# Patient Record
Sex: MALE | Race: BLACK OR AFRICAN AMERICAN
[De-identification: names, ages, dates, MRNs, and addresses within clinical notes are randomized per-mention and may not be internally consistent; named-entity substitution may affect disease eponyms.]

---
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
## 2025-03-01 ENCOUNTER — OUTPATIENT (OUTPATIENT)
Dept: OUTPATIENT SERVICES | Age: 20
LOS: 1 days | Discharge: HOME | End: 2025-03-01

## 2025-03-05 ENCOUNTER — LABORATORY RESULT (OUTPATIENT)
Age: 20
End: 2025-03-05

## 2025-03-05 ENCOUNTER — RESULT REVIEW (OUTPATIENT)
Age: 20
End: 2025-03-05

## 2025-03-05 ENCOUNTER — APPOINTMENT (OUTPATIENT)
Dept: PEDIATRIC HEMATOLOGY/ONCOLOGY | Facility: CLINIC | Age: 20
End: 2025-03-05
Payer: COMMERCIAL

## 2025-03-05 VITALS
RESPIRATION RATE: 22 BRPM | DIASTOLIC BLOOD PRESSURE: 75 MMHG | BODY MASS INDEX: 21.13 KG/M2 | TEMPERATURE: 98.06 F | OXYGEN SATURATION: 97 % | SYSTOLIC BLOOD PRESSURE: 121 MMHG | WEIGHT: 145.95 LBS | HEART RATE: 75 BPM | HEIGHT: 69.69 IN

## 2025-03-05 DIAGNOSIS — Z92.89 PERSONAL HISTORY OF OTHER MEDICAL TREATMENT: ICD-10-CM

## 2025-03-05 DIAGNOSIS — Z79.64 LONG TERM (CURRENT) USE OF MYELOSUPPRESSIVE AGENT: ICD-10-CM

## 2025-03-05 DIAGNOSIS — Z71.87 ENCOUNTER FOR PEDIATRIC-TO-ADULT TRANSITION COUNSELING: ICD-10-CM

## 2025-03-05 DIAGNOSIS — D57.42 SICKLE-CELL THALASSEMIA BETA ZERO WITHOUT CRISIS: ICD-10-CM

## 2025-03-05 LAB
BASOPHILS # BLD AUTO: 0.04 K/UL — SIGNIFICANT CHANGE UP (ref 0–0.2)
BASOPHILS NFR BLD AUTO: 0.6 % — SIGNIFICANT CHANGE UP (ref 0–2)
EOSINOPHIL # BLD AUTO: 0.06 K/UL — SIGNIFICANT CHANGE UP (ref 0–0.5)
EOSINOPHIL NFR BLD AUTO: 0.9 % — SIGNIFICANT CHANGE UP (ref 0–6)
HCT VFR BLD CALC: 35.1 % — LOW (ref 39–50)
HGB BLD-MCNC: 12.2 G/DL — LOW (ref 13–17)
IANC: 3.69 K/UL — SIGNIFICANT CHANGE UP (ref 1.8–7.4)
IMM GRANULOCYTES NFR BLD AUTO: 0.3 % — SIGNIFICANT CHANGE UP (ref 0–0.9)
LYMPHOCYTES # BLD AUTO: 2.24 K/UL — SIGNIFICANT CHANGE UP (ref 1–3.3)
LYMPHOCYTES # BLD AUTO: 33.6 % — SIGNIFICANT CHANGE UP (ref 13–44)
MCHC RBC-ENTMCNC: 27.2 PG — SIGNIFICANT CHANGE UP (ref 27–34)
MCHC RBC-ENTMCNC: 34.8 G/DL — SIGNIFICANT CHANGE UP (ref 32–36)
MCV RBC AUTO: 78.3 FL — LOW (ref 80–100)
MONOCYTES # BLD AUTO: 0.61 K/UL — SIGNIFICANT CHANGE UP (ref 0–0.9)
MONOCYTES NFR BLD AUTO: 9.2 % — SIGNIFICANT CHANGE UP (ref 2–14)
NEUTROPHILS # BLD AUTO: 3.69 K/UL — SIGNIFICANT CHANGE UP (ref 1.8–7.4)
NEUTROPHILS NFR BLD AUTO: 55.4 % — SIGNIFICANT CHANGE UP (ref 43–77)
NRBC # BLD AUTO: 0.06 K/UL — HIGH (ref 0–0)
NRBC # FLD: 0.06 K/UL — HIGH (ref 0–0)
NRBC BLD AUTO-RTO: 0 /100 WBCS — SIGNIFICANT CHANGE UP (ref 0–0)
PLATELET # BLD AUTO: 202 K/UL — SIGNIFICANT CHANGE UP (ref 150–400)
PMV BLD: 9.9 FL — SIGNIFICANT CHANGE UP (ref 7–13)
RBC # BLD: 4.48 M/UL — SIGNIFICANT CHANGE UP (ref 4.2–5.8)
RBC # BLD: 4.48 M/UL — SIGNIFICANT CHANGE UP (ref 4.2–5.8)
RBC # FLD: 14.1 % — SIGNIFICANT CHANGE UP (ref 10.3–14.5)
RETICS #: 169.3 K/UL — HIGH (ref 25–125)
RETICS/RBC NFR: 3.8 % — HIGH (ref 0.5–2.5)
WBC # BLD: 6.66 K/UL — SIGNIFICANT CHANGE UP (ref 3.8–10.5)
WBC # FLD AUTO: 6.66 K/UL — SIGNIFICANT CHANGE UP (ref 3.8–10.5)

## 2025-03-05 PROCEDURE — 99214 OFFICE O/P EST MOD 30 MIN: CPT

## 2025-03-06 DIAGNOSIS — Z23 ENCOUNTER FOR IMMUNIZATION: ICD-10-CM

## 2025-03-06 DIAGNOSIS — D57.01 HB-SS DISEASE WITH ACUTE CHEST SYNDROME: ICD-10-CM

## 2025-03-06 DIAGNOSIS — J45.909 UNSPECIFIED ASTHMA, UNCOMPLICATED: ICD-10-CM

## 2025-03-06 DIAGNOSIS — D57.42 SICKLE-CELL THALASSEMIA BETA ZERO WITHOUT CRISIS: ICD-10-CM

## 2025-03-06 DIAGNOSIS — D57.40 SICKLE-CELL THALASSEMIA WITHOUT CRISIS: ICD-10-CM

## 2025-05-01 ENCOUNTER — OUTPATIENT (OUTPATIENT)
Dept: OUTPATIENT SERVICES | Age: 20
LOS: 1 days | Discharge: HOME | End: 2025-05-01

## 2025-05-14 ENCOUNTER — RESULT REVIEW (OUTPATIENT)
Age: 20
End: 2025-05-14

## 2025-05-14 ENCOUNTER — APPOINTMENT (OUTPATIENT)
Dept: PEDIATRIC HEMATOLOGY/ONCOLOGY | Facility: CLINIC | Age: 20
End: 2025-05-14
Payer: COMMERCIAL

## 2025-05-14 VITALS
HEIGHT: 69.69 IN | RESPIRATION RATE: 20 BRPM | SYSTOLIC BLOOD PRESSURE: 119 MMHG | HEART RATE: 65 BPM | WEIGHT: 146.83 LBS | BODY MASS INDEX: 21.26 KG/M2 | DIASTOLIC BLOOD PRESSURE: 65 MMHG | OXYGEN SATURATION: 100 % | TEMPERATURE: 98.42 F

## 2025-05-14 DIAGNOSIS — Z92.89 PERSONAL HISTORY OF OTHER MEDICAL TREATMENT: ICD-10-CM

## 2025-05-14 DIAGNOSIS — D57.42 SICKLE-CELL THALASSEMIA BETA ZERO WITHOUT CRISIS: ICD-10-CM

## 2025-05-14 DIAGNOSIS — D57.40 SICKLE-CELL THALASSEMIA W/OUT CRISIS: ICD-10-CM

## 2025-05-14 DIAGNOSIS — Z71.87 ENCOUNTER FOR PEDIATRIC-TO-ADULT TRANSITION COUNSELING: ICD-10-CM

## 2025-05-14 DIAGNOSIS — Z79.64 LONG TERM (CURRENT) USE OF MYELOSUPPRESSIVE AGENT: ICD-10-CM

## 2025-05-14 LAB
BASOPHILS # BLD AUTO: 0.08 K/UL — SIGNIFICANT CHANGE UP (ref 0–0.2)
BASOPHILS NFR BLD AUTO: 0.9 % — SIGNIFICANT CHANGE UP (ref 0–2)
EOSINOPHIL # BLD AUTO: 0.25 K/UL — SIGNIFICANT CHANGE UP (ref 0–0.5)
EOSINOPHIL NFR BLD AUTO: 2.7 % — SIGNIFICANT CHANGE UP (ref 0–6)
HCT VFR BLD CALC: 34 % — LOW (ref 39–50)
HGB BLD-MCNC: 11.7 G/DL — LOW (ref 13–17)
IMM GRANULOCYTES NFR BLD AUTO: 0.5 % — SIGNIFICANT CHANGE UP (ref 0–0.9)
LYMPHOCYTES # BLD AUTO: 3.26 K/UL — SIGNIFICANT CHANGE UP (ref 1–3.3)
LYMPHOCYTES # BLD AUTO: 35.6 % — SIGNIFICANT CHANGE UP (ref 13–44)
MCHC RBC-ENTMCNC: 25.9 PG — LOW (ref 27–34)
MCHC RBC-ENTMCNC: 34.4 G/DL — SIGNIFICANT CHANGE UP (ref 32–36)
MCV RBC AUTO: 75.2 FL — LOW (ref 80–100)
MONOCYTES # BLD AUTO: 0.65 K/UL — SIGNIFICANT CHANGE UP (ref 0–0.9)
MONOCYTES NFR BLD AUTO: 7.1 % — SIGNIFICANT CHANGE UP (ref 2–14)
NEUTROPHILS # BLD AUTO: 4.88 K/UL — SIGNIFICANT CHANGE UP (ref 1.8–7.4)
NEUTROPHILS NFR BLD AUTO: 53.2 % — SIGNIFICANT CHANGE UP (ref 43–77)
NRBC # BLD AUTO: 0.16 K/UL — HIGH (ref 0–0)
NRBC # FLD: 0.16 K/UL — HIGH (ref 0–0)
NRBC BLD AUTO-RTO: 2 /100 WBCS — HIGH (ref 0–0)
PLATELET # BLD AUTO: 187 K/UL — SIGNIFICANT CHANGE UP (ref 150–400)
PMV BLD: 9 FL — SIGNIFICANT CHANGE UP (ref 7–13)
RBC # BLD: 4.52 M/UL — SIGNIFICANT CHANGE UP (ref 4.2–5.8)
RBC # BLD: 4.52 M/UL — SIGNIFICANT CHANGE UP (ref 4.2–5.8)
RBC # FLD: 14.7 % — HIGH (ref 10.3–14.5)
RETICS #: 226.9 K/UL — HIGH (ref 25–125)
RETICS/RBC NFR: 5 % — HIGH (ref 0.5–2.5)
WBC # BLD: 9.17 K/UL — SIGNIFICANT CHANGE UP (ref 3.8–10.5)
WBC # FLD AUTO: 9.17 K/UL — SIGNIFICANT CHANGE UP (ref 3.8–10.5)

## 2025-05-14 PROCEDURE — 99214 OFFICE O/P EST MOD 30 MIN: CPT

## 2025-05-15 DIAGNOSIS — D57.42 SICKLE-CELL THALASSEMIA BETA ZERO WITHOUT CRISIS: ICD-10-CM

## 2025-05-15 DIAGNOSIS — Z79.64 LONG TERM (CURRENT) USE OF MYELOSUPPRESSIVE AGENT: ICD-10-CM

## 2025-05-15 DIAGNOSIS — Z92.89 PERSONAL HISTORY OF OTHER MEDICAL TREATMENT: ICD-10-CM

## 2025-05-15 DIAGNOSIS — Z71.87 ENCOUNTER FOR PEDIATRIC-TO-ADULT TRANSITION COUNSELING: ICD-10-CM

## 2025-06-13 NOTE — ED PROVIDER NOTE - PRINCIPAL DIAGNOSIS
[facility] called in regards to the patient's prescription.  Wanted to discuss the strength.  Please call     [phone]   Acute chest syndrome

## 2025-07-22 NOTE — DIETITIAN INITIAL EVALUATION PEDIATRIC - FEEDING SKILL
Health Decision Maker has been checked with the patient      AI form was signed    Chief Complaint   Patient presents with    Generalized Body Aches    Fever    Cough    Pharyngitis    Other     Patient tested for covid flu and rsv last Thursday/Friday and all tests were negative       \"Have you been to the ER, urgent care clinic since your last visit?  Hospitalized since your last visit?\"    NO    “Have you seen or consulted any other health care providers outside of [facility] since your last visit?”    NO      There were no vitals filed for this visit.   Depression: Not at risk (6/6/2025)    PHQ-2     PHQ-2 Score: 0              Click Here for Release of Records Request    Chart reviewed: immunizations are documented.   Immunization History   Administered Date(s) Administered    COVID-19, PFIZER PURPLE top, DILUTE for use, (age 12 y+), 30mcg/0.3mL 03/04/2021, 03/25/2021, 12/21/2021    Influenza Virus Vaccine 10/10/2014, 10/08/2015, 10/30/2018, 10/05/2020, 10/21/2021    Pneumococcal, PCV20, PREVNAR 20, (age 6w+), IM, 0.5mL 01/29/2024    TDaP, ADACEL (age 10y-64y), BOOSTRIX (age 10y+), IM, 0.5mL 07/01/2006, 06/23/2014    Zoster Recombinant (Shingrix) 09/27/2021      
Strep positive - give Amox 500mg BID x 10 days, swap tooth brush after 24 hours, contagious for first 24 hours  - Will get CXR for shortness of breath/ burning sensation  - EKG okay today but discussed if persistent pain or worsening should go to ER for further evaluation       I have discussed the diagnosis with the patient and the intended plan as seen in the above orders. The patient has received an after-visit summary and questions were answered concerning future plans.  I have discussed medication side effects and warnings with the patient as well.    The patient (or guardian, if applicable) and other individuals in attendance with the patient were advised that Artificial Intelligence will be utilized during this visit to record and process the conversation to generate a clinical note. The patient (or guardian, if applicable) and other individuals in attendance at the appointment consented to the use of AI, including the recording.       Dat Arnett DO    
independent

## 2025-08-20 ENCOUNTER — RESULT REVIEW (OUTPATIENT)
Age: 20
End: 2025-08-20

## 2025-08-20 ENCOUNTER — APPOINTMENT (OUTPATIENT)
Dept: PEDIATRIC HEMATOLOGY/ONCOLOGY | Facility: CLINIC | Age: 20
End: 2025-08-20
Payer: COMMERCIAL

## 2025-08-20 VITALS
HEIGHT: 68.98 IN | BODY MASS INDEX: 21.22 KG/M2 | WEIGHT: 143.3 LBS | SYSTOLIC BLOOD PRESSURE: 122 MMHG | OXYGEN SATURATION: 99 % | HEART RATE: 85 BPM | RESPIRATION RATE: 20 BRPM | DIASTOLIC BLOOD PRESSURE: 73 MMHG | TEMPERATURE: 98.78 F

## 2025-08-20 DIAGNOSIS — Z79.64 LONG TERM (CURRENT) USE OF MYELOSUPPRESSIVE AGENT: ICD-10-CM

## 2025-08-20 DIAGNOSIS — J45.909 UNSPECIFIED ASTHMA, UNCOMPLICATED: ICD-10-CM

## 2025-08-20 DIAGNOSIS — D57.40 SICKLE-CELL THALASSEMIA W/OUT CRISIS: ICD-10-CM

## 2025-08-20 DIAGNOSIS — D57.42 SICKLE-CELL THALASSEMIA BETA ZERO WITHOUT CRISIS: ICD-10-CM

## 2025-08-20 DIAGNOSIS — Z71.87 ENCOUNTER FOR PEDIATRIC-TO-ADULT TRANSITION COUNSELING: ICD-10-CM

## 2025-08-20 PROCEDURE — 99214 OFFICE O/P EST MOD 30 MIN: CPT

## 2025-08-25 ENCOUNTER — APPOINTMENT (OUTPATIENT)
Dept: PEDIATRICS | Facility: CLINIC | Age: 20
End: 2025-08-25